# Patient Record
Sex: FEMALE | Race: BLACK OR AFRICAN AMERICAN | NOT HISPANIC OR LATINO | Employment: OTHER | URBAN - METROPOLITAN AREA
[De-identification: names, ages, dates, MRNs, and addresses within clinical notes are randomized per-mention and may not be internally consistent; named-entity substitution may affect disease eponyms.]

---

## 2022-06-02 ENCOUNTER — TELEPHONE (OUTPATIENT)
Dept: NEPHROLOGY | Facility: CLINIC | Age: 77
End: 2022-06-02
Payer: COMMERCIAL

## 2022-06-02 NOTE — TELEPHONE ENCOUNTER
----- Message from Denice Cristina sent at 6/1/2022  2:12 PM CDT -----  Contact: PT  Type: Needs Medical Advice    Who Called: PT  Best Call Back Number: [phone]  Additional  Information: Pt would like a call back to see if she can be switchwed to see one of the providers In at [facility]. Pt said she does not have a car and rely on transportation services for rides to appts. They told her they do not go to [facility]  Please Advise- Thank you

## 2022-06-02 NOTE — TELEPHONE ENCOUNTER
Messaged Dionna to ask if she could see her.  Informed pt that we would find her an appt slot in [place] and call her back.  Ok from Dr Sandoval for pt to be seen in [place].  Left message for pt that appt is 6/30/22 @ 2:30.

## 2022-09-19 DIAGNOSIS — N18.32 STAGE 3B CHRONIC KIDNEY DISEASE: Primary | ICD-10-CM

## 2022-09-21 DIAGNOSIS — N18.32 STAGE 3B CHRONIC KIDNEY DISEASE: Primary | ICD-10-CM

## 2022-09-21 DIAGNOSIS — D63.1 ANEMIA OF CHRONIC RENAL FAILURE, UNSPECIFIED CKD STAGE: ICD-10-CM

## 2022-09-21 DIAGNOSIS — N18.9 ANEMIA OF CHRONIC RENAL FAILURE, UNSPECIFIED CKD STAGE: ICD-10-CM

## 2022-09-21 PROBLEM — I12.9 HYPERTENSION WITH IMPAIRED RENAL FUNCTION: Status: ACTIVE | Noted: 2022-09-21

## 2022-09-21 PROBLEM — N25.81 SECONDARY HYPERPARATHYROIDISM OF RENAL ORIGIN: Status: ACTIVE | Noted: 2022-09-21

## 2022-09-21 PROBLEM — N18.4 CHRONIC KIDNEY DISEASE, STAGE IV (SEVERE): Status: ACTIVE | Noted: 2022-09-21

## 2022-09-21 PROBLEM — E11.9 DIABETES MELLITUS TYPE II, NON INSULIN DEPENDENT: Status: ACTIVE | Noted: 2022-09-21

## 2022-09-21 PROBLEM — E78.2 HYPERLIPIDEMIA, MIXED: Status: ACTIVE | Noted: 2022-09-21

## 2022-09-21 PROBLEM — R80.9 NON-NEPHROTIC RANGE PROTEINURIA: Status: ACTIVE | Noted: 2022-09-21

## 2022-09-21 NOTE — ASSESSMENT & PLAN NOTE
Serum Creatinine 1.75 / eGFR 32; Avoid NSAIDS, Assure 64 oz of water daily, Meds per med list above

## 2022-09-21 NOTE — PROGRESS NOTES
Pt Name:  Maribell Noel  Pt :  1945  Pt MRN:  [id number]    Date: 2022    Reason for visit:   Maribell Noel is a 77 y.o. aa female presenting for CKD follow up with serum creatinine 1.75, eGFR 32 and Chronic Kidney Disease Stage 3b.     Chief Complaint:   The patient denies any complaints today.    HPI:  The patient is being seen today for follow up CKD and the status of her kidney function. Since the last visit, patient reports no health changes.  The patient denies fatigue, weakness, poor appetite, metallic taste, nausea, cramping, chest pain, palpitations, SOB, orthopnea, PND, edema, trouble concentrating, decreased urination.      Home BPs when checked are <130/80 per patient. The patient is following a low sodium diet. The patient is avoiding NSAIDs. The patient is drinking 32 oz of water daily.     Since last visit on 22 by Dr. Sandoval patient reports no changes in medical history.      History:   Past Medical History:   Diagnosis Date    Diabetes mellitus     DM for about 20 yrs    Diabetic macular edema(362.07) 2012    Diabetic macular edema, both eyes     Disorder of kidney and ureter     Glaucoma     Hypertension     Vitreous hemorrhage of right eye      Past Surgical History:   Procedure Laterality Date    CATARACT EXTRACTION      [employer] Injections      Right eye     Family History   Problem Relation Age of Onset    Diabetes Sister     Blindness Neg Hx     Cataracts Neg Hx     Glaucoma Neg Hx     Cancer Daughter      Social History     Substance and Sexual Activity   Alcohol Use No     Social History     Substance and Sexual Activity   Drug Use Not on file     Social History     Substance and Sexual Activity   Sexual Activity Not on file     has no history on file for sexual activity.  Social History     Tobacco Use   Smoking Status Former    Packs/day: 1.00    Years: 49.00    Pack years: 49.00    Types: Cigarettes    Quit date:     Years since quittin.7   Smokeless Tobacco  Never       Allergies:  Review of patient's allergies indicates:   Allergen Reactions    Gabapentin Anaphylaxis         Current Outpatient Medications:     AMLODIPINE BESYLATE (NORVASC ORAL), Take 5 mg by mouth once., Disp: , Rfl:     aspirin (ECOTRIN) 81 MG EC tablet, Take 81 mg by mouth once daily.  , Disp: , Rfl:     atorvastatin (LIPITOR) 80 MG tablet, Take 80 mg by mouth every evening., Disp: , Rfl:     hydroCHLOROthiazide (HYDRODIURIL) 25 MG tablet, Take 25 mg by mouth once daily., Disp: , Rfl:     insulin (LANTUS SOLOSTAR U-100 INSULIN) glargine 100 units/mL SubQ pen, Inject 80 Units into the skin once daily., Disp: , Rfl:     latanoprost 0.005 % ophthalmic solution, 1 drop every evening., Disp: , Rfl:     LISINOPRIL ORAL, Take 20 mg by mouth once daily., Disp: , Rfl:     neomycin-polymyxin-dexamethasone (MAXITROL) 3.5mg/mL-10,000 unit/mL-0.1 % DrpS, 1 drop every 6 (six) hours. prn, Disp: , Rfl:     OMEGA-3 FATTY ACIDS/FISH OIL (OMEGA 3 FISH OIL ORAL), Take 2 capsules by mouth once.  , Disp: , Rfl:     pantoprazole (PROTONIX) 40 MG tablet, Take 40 mg by mouth once daily., Disp: , Rfl:     semaglutide (OZEMPIC) 0.25 mg or 0.5 mg(2 mg/1.5 mL) pen injector, Inject 0.25 mg into the skin every 7 days., Disp: , Rfl:     timolol maleate 0.5% (TIMOPTIC) 0.5 % Drop, Place 1 drop into both eyes once daily., Disp: , Rfl:     ZETIA 10 mg tablet, 10 mg once daily., Disp: , Rfl:     brimonidine-timolol (COMBIGAN) 0.2-0.5 % Drop, Place 1 drop into both eyes 2 (two) times daily. Dispense 3 month supply with 3 refills, Disp: 10 mL, Rfl: 6    calcitRIOL (ROCALTROL) 0.25 MCG Cap, Take 1 capsule (0.25 mcg total) by mouth every Mon, Wed, Fri., Disp: 30 capsule, Rfl: 11  No current facility-administered medications for this visit.    ROS:  Review of Systems   Constitutional:  Negative for activity change and appetite change.   HENT:  Negative for hearing loss.    Eyes:  Negative for visual disturbance.   Respiratory:  Negative for  "shortness of breath and wheezing.    Cardiovascular:  Negative for chest pain.   Gastrointestinal:  Negative for abdominal pain, diarrhea, nausea and vomiting.   Genitourinary:  Negative for decreased urine volume, dysuria, flank pain, frequency and urgency.   Neurological:  Negative for dizziness, weakness and headaches.     Physical Exam:   Vitals:   Vitals:    09/23/22 1002   BP: 133/62   Pulse: 65   SpO2: 95%   Weight: 106.3 kg (234 lb 6.4 oz)   Height: 5' 6" (1.676 m)   PainSc:   6   PainLoc: Back     Body mass index is 37.83 kg/m².    Physical Exam  Vitals reviewed.   Constitutional:       General: She is not in acute distress.     Appearance: Normal appearance.   HENT:      Head: Normocephalic and atraumatic.      Mouth/Throat:      Mouth: Mucous membranes are moist.   Eyes:      Extraocular Movements: Extraocular movements intact.      Pupils: Pupils are equal, round, and reactive to light.   Cardiovascular:      Rate and Rhythm: Normal rate and regular rhythm.      Heart sounds: No murmur heard.  Pulmonary:      Effort: Pulmonary effort is normal.      Breath sounds: No wheezing, rhonchi or rales.   Musculoskeletal:         General: No swelling.      Comments: Uses cane for ambulatory assistance    Skin:     General: Skin is warm and dry.   Neurological:      Mental Status: She is alert and oriented to person, place, and time.   Psychiatric:         Mood and Affect: Mood normal.         Behavior: Behavior normal.       Labs/Tests:  Lab Results   Component Value Date     09/20/2022    K 4.4 09/20/2022     09/20/2022    CO2 24 09/20/2022    BUN 45 (H) 09/20/2022    CREATININE 1.75 (H) 09/20/2022    CREATININE 1.53 (H) 06/02/2022    CREATININE 1.90 (H) 01/28/2022    GLUCOSE 92 09/20/2022    CALCIUM 9.7 09/20/2022    PHOSPHORUS 3.1 09/20/2022    ALBUMIN 4.1 09/20/2022    EGFRNONAA 28 (L) 09/20/2022    EGFRNONAA 33 (L) 06/02/2022    EGFRNONAA 25 (L) 01/28/2022    ESTGFRAFRICA 32 (L) 09/20/2022    " ESTGFRAFRICA 38 (L) 06/02/2022    ESTGFRAFRICA 29 (L) 01/28/2022     (H) 09/20/2022    HGB 9.9 (L) 09/20/2022    HGBA1C 8.4 (H) 12/06/2021    IRON 33 (L) 09/20/2022    TIBC 331 09/20/2022    FERRITIN 9.7 09/20/2022    TRIG 87 06/02/2022    CHOL 148 06/02/2022    HDL 45 06/02/2022    LDLCALC 86 06/02/2022    LABVLDL 17 06/02/2022     Component Ref Range & Units 1 d ago   (9/20/22) 1 yr ago   (7/29/21) 1 yr ago   (3/17/21) 2 yr ago   (6/16/20)   Protein, Urine mg/dL mg/dL 21       Creatinine, Urine mg/dL mg/dL 173.9  55.1  189.0  140.0    Urine Protein/Creatinine Ratio <0.2 mg of protein/mg of creatinine 0.1         Component Ref Range & Units 2 d ago    Ferritin 7.3 - 270.7 ng/mL 9.7      Component Ref Range & Units 2 d ago    Iron 50 - 175 ug/dL 33 Low     TIBC 250 - 425 ug/dL 331    Iron Saturation 15 - 20 % 10 Low         Diagnosis:  1. Diabetes mellitus type II, non insulin dependent  Renal Function Panel    Protein/Creatinine Ratio, Urine    Renal Function Panel    Protein/Creatinine Ratio, Urine      2. Hypertension with impaired renal function  Renal Function Panel    Renal Function Panel      3. Chronic kidney disease, stage IV (severe)  Renal Function Panel    PTH, Intact    Protein/Creatinine Ratio, Urine    Vitamin D    Renal Function Panel    PTH, Intact    Protein/Creatinine Ratio, Urine    Vitamin D      4. Non-nephrotic range proteinuria  Protein/Creatinine Ratio, Urine    Protein/Creatinine Ratio, Urine      5. Secondary hyperparathyroidism of renal origin  PTH, Intact    Vitamin D    PTH, Intact    Vitamin D      6. Hyperlipidemia, mixed        7. Anemia in stage 3b chronic kidney disease  Hemoglobin    Hemoglobin      8. Iron deficiency             Plan:  Diabetes mellitus type II, non insulin dependent  Control status unknown - Continue Lantus and Ozempic     Hypertension with impaired renal function  At goal, Monitor BP, 2 Gram NA diet, Continue Amlodipine 5 mg po daily, HCTZ 25 mg po  daily, Lisinopril 20 mg po daily     Chronic kidney disease, stage IV (severe)  Serum Creatinine 1.75 / eGFR 32; Avoid NSAIDS, Assure 64 oz of water daily, Meds per med list above     Non-nephrotic range proteinuria  100 mg - ACE    Secondary hyperparathyroidism of renal origin   - Start Rocaltrol 25 mcg po M-W-F     Hyperlipidemia, mixed  Continue - Lipitor 80 mg po nightly, Zetia 10 mg po nightly, Omega 3 fish oil po daily     Anemia in stage 3b chronic kidney disease  Hgb 9.9 - No indication for JOSE MARIA therapy at this time    Iron Deficient - Will Replete  Iron 33  TIBC 331  Sat 10%  Ferritin 9.7    Iron deficiency  Iron 33  Sat 10%  TIBC 331  Ferritin 9.7     Will replete with IV Iron        My reconciliation of medication should not condone or support use of medications that have been previously prescribed for patients by other providers.  I am only documenting the current medications patients is taking and may change doses, add or discontinue medications based on information provided by the patient.     The patient has been provided with their current level of kidney function including eGFR and creatinine.    We discussed the potential for common complications of CKD including anemia, electrolyte abnormalities, abnormal fluid balance, mineral bone disease and malnutrition.    We discussed strategies to slow the progression of their kidney disease including:  Avoid nephrotoxic agents. Avoid over-the-counter and prescription NSAIDs (Ibuprofren, Motrin, Naproxyn, Aleve, Mobic, Celebrex, Toradol, Advil). All of these can worsen kidney function, elevate BP, cause fluid retention/swelling and elevate potassium. Avoid iodine contrast agents and gadolinium, which can worsen kidney function and/or cause kidney failure. Avoid phosphosoda bowel preps which can worsen kidney function.  Work to improve modifiable risk factors. Aim for good control of blood glucose without episodes of hypoglycemia. Notify the provider  managing your diabetes if your blood glucose < 60. Aim for good blood pressure control without episodes of hypotension. Call the office if your systolic blood pressure is consistently < 110. Aim for good control of your cholesterol.  AIC goal <7.0  BP goal <130/80        Keeping these in goal range will help prevent progression of cardiovascular disease            and chronic kidney disease.    We discussed dietary modifications:  Low sodium diet: 2 gm/d or less  Limit/avoid high potassium foods  Avoid potassium containing salt substitutes  Limit/avoid high phosphorus foods  Limit daily protein intake to 0.8-1 gm/kg of your ideal body weight.    We discussed lifestyle modifications:  Make sure you are drinking plenty of fluids--64 ounces (1/2 gallon) daily  Exercise at least 30 minutes 5 x per week (total 150 minutes per week), example brisk walking  Achieve and maintain a healthy weight (BMI 20-25)  Limit alcohol consumption to <2 drinks per day  Stop smoking  Make sure you stay current on important vaccines-- pneumonia vaccines (Pneumovax and Prevnar), flu vaccine, Hepatitis B (especially patients nearing renal replacement therapy and planning hemodialysis) and Covid-19 vaccine.     Recommendations:  Monitor your BP at home daily and record.  Bring readings to your next appt.  Call the office if your BP is persistently >130/80.  Seek urgent medical attention with signs and symptoms of uremia - extreme weakness, fatigue, confusion, anorexia, metallic taste in mouth, hiccoughs, cramping, itching, chest pain, swelling, or trouble sleeping.    Follow Up:   Follow up in about 3 months (around 12/23/2022).

## 2022-09-21 NOTE — ASSESSMENT & PLAN NOTE
At goal, Monitor BP, 2 Gram NA diet, Continue Amlodipine 5 mg po daily, HCTZ 25 mg po daily, Lisinopril 20 mg po daily

## 2022-09-22 PROBLEM — D63.1 ANEMIA IN STAGE 3B CHRONIC KIDNEY DISEASE: Status: ACTIVE | Noted: 2022-09-22

## 2022-09-22 PROBLEM — N18.32 ANEMIA IN STAGE 3B CHRONIC KIDNEY DISEASE: Status: ACTIVE | Noted: 2022-09-22

## 2022-09-22 PROBLEM — E61.1 IRON DEFICIENCY: Status: ACTIVE | Noted: 2022-09-22

## 2022-09-22 NOTE — ASSESSMENT & PLAN NOTE
Hgb 9.9 - No indication for JOSE MARIA therapy at this time    Iron Deficient - Will Replete  Iron 33  TIBC 331  Sat 10%  Ferritin 9.7

## 2022-09-23 ENCOUNTER — OFFICE VISIT (OUTPATIENT)
Dept: NEPHROLOGY | Facility: CLINIC | Age: 77
End: 2022-09-23
Payer: COMMERCIAL

## 2022-09-23 VITALS
WEIGHT: 234.38 LBS | HEIGHT: 66 IN | HEART RATE: 65 BPM | SYSTOLIC BLOOD PRESSURE: 133 MMHG | BODY MASS INDEX: 37.67 KG/M2 | DIASTOLIC BLOOD PRESSURE: 62 MMHG | OXYGEN SATURATION: 95 %

## 2022-09-23 DIAGNOSIS — E61.1 IRON DEFICIENCY: ICD-10-CM

## 2022-09-23 DIAGNOSIS — N25.81 SECONDARY HYPERPARATHYROIDISM OF RENAL ORIGIN: ICD-10-CM

## 2022-09-23 DIAGNOSIS — D63.1 ANEMIA IN STAGE 3B CHRONIC KIDNEY DISEASE: ICD-10-CM

## 2022-09-23 DIAGNOSIS — R80.9 NON-NEPHROTIC RANGE PROTEINURIA: ICD-10-CM

## 2022-09-23 DIAGNOSIS — N18.32 ANEMIA IN STAGE 3B CHRONIC KIDNEY DISEASE: ICD-10-CM

## 2022-09-23 DIAGNOSIS — E11.9 DIABETES MELLITUS TYPE II, NON INSULIN DEPENDENT: Primary | ICD-10-CM

## 2022-09-23 DIAGNOSIS — I12.9 HYPERTENSION WITH IMPAIRED RENAL FUNCTION: ICD-10-CM

## 2022-09-23 DIAGNOSIS — E78.2 HYPERLIPIDEMIA, MIXED: ICD-10-CM

## 2022-09-23 DIAGNOSIS — N18.4 CHRONIC KIDNEY DISEASE, STAGE IV (SEVERE): ICD-10-CM

## 2022-09-23 PROCEDURE — 99214 OFFICE O/P EST MOD 30 MIN: CPT | Mod: ,,, | Performed by: NURSE PRACTITIONER

## 2022-09-23 PROCEDURE — 99214 PR OFFICE/OUTPT VISIT, EST, LEVL IV, 30-39 MIN: ICD-10-PCS | Mod: ,,, | Performed by: NURSE PRACTITIONER

## 2022-09-23 RX ORDER — NEOMYCIN SULFATE, POLYMYXIN B SULFATE AND DEXAMETHASONE 3.5; 10000; 1 MG/ML; [USP'U]/ML; MG/ML
1 SUSPENSION/ DROPS OPHTHALMIC
COMMUNITY

## 2022-09-23 RX ORDER — CALCITRIOL 0.25 UG/1
0.25 CAPSULE ORAL
Qty: 30 CAPSULE | Refills: 11 | Status: SHIPPED | OUTPATIENT
Start: 2022-09-23 | End: 2023-02-03

## 2022-09-23 RX ORDER — ATORVASTATIN CALCIUM 80 MG/1
80 TABLET, FILM COATED ORAL NIGHTLY
COMMUNITY

## 2022-09-23 RX ORDER — TIMOLOL MALEATE 5 MG/ML
1 SOLUTION/ DROPS OPHTHALMIC DAILY
COMMUNITY

## 2022-09-23 RX ORDER — LATANOPROST 50 UG/ML
1 SOLUTION/ DROPS OPHTHALMIC NIGHTLY
COMMUNITY

## 2022-09-23 RX ORDER — SEMAGLUTIDE 1.34 MG/ML
0.5 INJECTION, SOLUTION SUBCUTANEOUS
COMMUNITY

## 2022-09-23 RX ORDER — HYDROCHLOROTHIAZIDE 25 MG/1
12.5 TABLET ORAL DAILY
COMMUNITY
End: 2023-06-08

## 2022-09-23 RX ORDER — INSULIN GLARGINE 100 [IU]/ML
70 INJECTION, SOLUTION SUBCUTANEOUS DAILY
COMMUNITY

## 2022-09-23 RX ORDER — PANTOPRAZOLE SODIUM 40 MG/1
40 TABLET, DELAYED RELEASE ORAL DAILY
COMMUNITY

## 2022-12-16 DIAGNOSIS — N18.31 STAGE 3A CHRONIC KIDNEY DISEASE: Primary | ICD-10-CM

## 2022-12-20 NOTE — PROGRESS NOTES
Patient iron studies improved after Iron load but remains deficient; will load again:       Component Ref Range & Units 1 d ago 3 mo ago   Iron 50 - 175 ug/dL 38 Low   33 Low     TIBC 250 - 425 ug/dL 237 Low   331    Iron Saturation 15 - 20 % 16  10 Low       Component Ref Range & Units 1 d ago 3 mo ago   Ferritin 7.3 - 270.7 ng/mL 121.4  9.7      Patient notified and made aware.

## 2023-02-01 ENCOUNTER — TELEPHONE (OUTPATIENT)
Dept: NEPHROLOGY | Facility: CLINIC | Age: 78
End: 2023-02-01
Payer: COMMERCIAL

## 2023-02-01 NOTE — TELEPHONE ENCOUNTER
Tuesday Summer- I called Mrs. Reyna and asked her about her urine if she left one. She said no she could use the restroom. I asked her if she could go back? she said she would see, she doesn't have transportation. I will see if Nery  can go out to her and get a sample.    Tuesday -I spoke with Natacha she said they dont go out for just urine.

## 2023-02-03 ENCOUNTER — OFFICE VISIT (OUTPATIENT)
Dept: NEPHROLOGY | Facility: CLINIC | Age: 78
End: 2023-02-03
Payer: COMMERCIAL

## 2023-02-03 VITALS
DIASTOLIC BLOOD PRESSURE: 63 MMHG | OXYGEN SATURATION: 98 % | HEART RATE: 62 BPM | HEIGHT: 66 IN | WEIGHT: 221 LBS | SYSTOLIC BLOOD PRESSURE: 133 MMHG | BODY MASS INDEX: 35.52 KG/M2

## 2023-02-03 DIAGNOSIS — N18.32 STAGE 3B CHRONIC KIDNEY DISEASE: ICD-10-CM

## 2023-02-03 DIAGNOSIS — I12.9 HYPERTENSION WITH IMPAIRED RENAL FUNCTION: ICD-10-CM

## 2023-02-03 DIAGNOSIS — Z79.4 TYPE 2 DIABETES MELLITUS WITH STAGE 3B CHRONIC KIDNEY DISEASE, WITH LONG-TERM CURRENT USE OF INSULIN: Primary | ICD-10-CM

## 2023-02-03 DIAGNOSIS — E78.2 HYPERLIPIDEMIA, MIXED: ICD-10-CM

## 2023-02-03 DIAGNOSIS — E61.1 IRON DEFICIENCY: ICD-10-CM

## 2023-02-03 DIAGNOSIS — N18.32 TYPE 2 DIABETES MELLITUS WITH STAGE 3B CHRONIC KIDNEY DISEASE, WITH LONG-TERM CURRENT USE OF INSULIN: Primary | ICD-10-CM

## 2023-02-03 DIAGNOSIS — N25.81 SECONDARY HYPERPARATHYROIDISM OF RENAL ORIGIN: ICD-10-CM

## 2023-02-03 DIAGNOSIS — S81.802A WOUND OF LEFT LOWER EXTREMITY, INITIAL ENCOUNTER: ICD-10-CM

## 2023-02-03 DIAGNOSIS — E55.9 VITAMIN D DEFICIENCY, UNSPECIFIED: ICD-10-CM

## 2023-02-03 DIAGNOSIS — E11.22 TYPE 2 DIABETES MELLITUS WITH STAGE 3B CHRONIC KIDNEY DISEASE, WITH LONG-TERM CURRENT USE OF INSULIN: Primary | ICD-10-CM

## 2023-02-03 DIAGNOSIS — R80.9 NON-NEPHROTIC RANGE PROTEINURIA: ICD-10-CM

## 2023-02-03 DIAGNOSIS — E87.20 METABOLIC ACIDOSIS: ICD-10-CM

## 2023-02-03 PROCEDURE — 99214 PR OFFICE/OUTPT VISIT, EST, LEVL IV, 30-39 MIN: ICD-10-PCS | Mod: ,,, | Performed by: NURSE PRACTITIONER

## 2023-02-03 PROCEDURE — 99214 OFFICE O/P EST MOD 30 MIN: CPT | Mod: ,,, | Performed by: NURSE PRACTITIONER

## 2023-02-03 RX ORDER — FAMOTIDINE 20 MG/1
20 TABLET, FILM COATED ORAL
COMMUNITY
Start: 2023-01-10 | End: 2023-09-27

## 2023-02-03 RX ORDER — SODIUM BICARBONATE 650 MG/1
650 TABLET ORAL 3 TIMES DAILY
Qty: 90 TABLET | Refills: 11 | Status: SHIPPED | OUTPATIENT
Start: 2023-02-03 | End: 2023-06-08

## 2023-02-03 RX ORDER — CALCITRIOL 0.25 UG/1
0.25 CAPSULE ORAL
Qty: 30 CAPSULE | Refills: 11 | Status: SHIPPED | OUTPATIENT
Start: 2023-02-03 | End: 2023-06-08

## 2023-02-03 RX ORDER — FLUTICASONE PROPIONATE 50 MCG
SPRAY, SUSPENSION (ML) NASAL
COMMUNITY

## 2023-02-03 RX ORDER — CETIRIZINE HYDROCHLORIDE 10 MG/1
CAPSULE, LIQUID FILLED ORAL
COMMUNITY

## 2023-02-03 RX ORDER — VIT C/E/ZN/COPPR/LUTEIN/ZEAXAN 250MG-90MG
10000 CAPSULE ORAL DAILY
Qty: 30 CAPSULE | Refills: 11
Start: 2023-02-03 | End: 2023-06-08

## 2023-02-03 RX ORDER — HYDROCODONE BITARTRATE AND ACETAMINOPHEN 10; 325 MG/1; MG/1
1 TABLET ORAL
COMMUNITY
Start: 2023-01-07

## 2023-02-03 RX ORDER — SUCRALFATE 1 G/1
1 TABLET ORAL
COMMUNITY
Start: 2022-09-07 | End: 2023-06-08

## 2023-02-03 NOTE — ASSESSMENT & PLAN NOTE
LLE wound 2 x 2 with erythema and abrasion in the center; refer to wound care - culture and biopsy

## 2023-02-03 NOTE — ASSESSMENT & PLAN NOTE
IV Iron course in process -     Component Ref Range & Units 1 mo ago 4 mo ago   Iron 50 - 175 ug/dL 38 Low   33 Low     TIBC 250 - 425 ug/dL 237 Low   331    Iron Saturation 15 - 20 % 16  10 Low       Component Ref Range & Units 1 mo ago 4 mo ago   Ferritin 7.3 - 270.7 ng/mL 121.4  9.7

## 2023-02-03 NOTE — ASSESSMENT & PLAN NOTE
Serum Creatinine 1.61 / 33  (1.75 / eGFR 32 on 9/23/22) - without Anemia - Avoid NSAIDS, Assure 64 oz of water daily, Meds per med list above

## 2023-02-03 NOTE — PROGRESS NOTES
"Pt Name:  Maribell Noel  Pt :  1945  Pt MRN:  [id number]    Date: 2/3/2023    Reason for visit:   Maribell Noel is a 77 y.o. aa female presenting for CKD follow up with serum creatinine 1.61, eGFR 33 and Chronic Kidney Disease Stage 3b.     Chief Complaint:   The patient denies any complaints today.    HPI:  The patient is being seen today for follow up CKD and the status of her kidney function. Since the last visit, patient reports she noticed a wound to the LLE lateral aspect of her leg.  The area is approximately 2 x 2 inches in diameter with a erythematous area including an abrasion appearance in the center.  She denies "pain" but says it is "sore".   She is in the process of receiving weekly IV Iron infusions and thinks it may be due to the IV Iron. She had a prior repletion of IV Iron without incidence.     The patient denies fatigue, weakness, poor appetite, metallic taste, nausea, cramping, chest pain, palpitations, SOB, orthopnea, PND, edema, trouble concentrating, decreased urination.      Home BPs when checked are <130/80 per patient. The patient is following a low sodium diet. The patient is avoiding NSAIDs. The patient is drinking 24 - 32 oz of water + coffee and tea / lemonade daily.     Since last visit on 22 patient reports above noted changes in medical history.      History:   Past Medical History:   Diagnosis Date    Anemia in stage 3b chronic kidney disease 2022    Diabetes mellitus     DM for about 20 yrs    Diabetic macular edema(362.07) 2012    Diabetic macular edema, both eyes     Disorder of kidney and ureter     Glaucoma     Hypertension     Vitreous hemorrhage of right eye      Past Surgical History:   Procedure Laterality Date    CATARACT EXTRACTION      [employer] Injections      Right eye     Family History   Problem Relation Age of Onset    Diabetes Sister     Blindness Neg Hx     Cataracts Neg Hx     Glaucoma Neg Hx     Cancer Daughter      Social History "     Substance and Sexual Activity   Alcohol Use No     Social History     Substance and Sexual Activity   Drug Use Not on file     Social History     Substance and Sexual Activity   Sexual Activity Not on file     has no history on file for sexual activity.  Social History     Tobacco Use   Smoking Status Former    Packs/day: 1.00    Years: 49.00    Pack years: 49.00    Types: Cigarettes    Quit date: 2013    Years since quitting: 10.0   Smokeless Tobacco Never       Allergies:  Review of patient's allergies indicates:   Allergen Reactions    Gabapentin Anaphylaxis         Current Outpatient Medications:     AMLODIPINE BESYLATE (NORVASC ORAL), Take 5 mg by mouth once., Disp: , Rfl:     aspirin (ECOTRIN) 81 MG EC tablet, Take 81 mg by mouth once daily.  , Disp: , Rfl:     atorvastatin (LIPITOR) 80 MG tablet, Take 80 mg by mouth every evening., Disp: , Rfl:     biotin 300 mcg Tab, Take 300 mcg by mouth once daily., Disp: , Rfl:     cetirizine (ZYRTEC) 10 mg Cap, as needed., Disp: , Rfl:     famotidine (PEPCID) 20 MG tablet, Take 20 mg by mouth as needed., Disp: , Rfl:     fluticasone propionate (FLONASE) 50 mcg/actuation nasal spray, as needed., Disp: , Rfl:     hydroCHLOROthiazide (HYDRODIURIL) 25 MG tablet, Take 25 mg by mouth once daily., Disp: , Rfl:     HYDROcodone-acetaminophen (NORCO)  mg per tablet, Take 1 tablet by mouth as needed., Disp: , Rfl:     insulin glargine 100 units/mL SubQ pen, Inject 80 Units into the skin once daily., Disp: , Rfl:     latanoprost 0.005 % ophthalmic solution, 1 drop every evening., Disp: , Rfl:     LISINOPRIL ORAL, Take 20 mg by mouth once daily., Disp: , Rfl:     neomycin-polymyxin-dexamethasone (MAXITROL) 3.5mg/mL-10,000 unit/mL-0.1 % DrpS, 1 drop every 6 (six) hours. prn, Disp: , Rfl:     OMEGA-3 FATTY ACIDS/FISH OIL (OMEGA 3 FISH OIL ORAL), Take 2 capsules by mouth once.  , Disp: , Rfl:     pantoprazole (PROTONIX) 40 MG tablet, Take 40 mg by mouth once daily., Disp: ,  "Rfl:     semaglutide (OZEMPIC) 0.25 mg or 0.5 mg(2 mg/1.5 mL) pen injector, Inject 0.25 mg into the skin every 7 days., Disp: , Rfl:     sucralfate (CARAFATE) 1 gram tablet, Take 1 g by mouth as needed., Disp: , Rfl:     timolol maleate 0.5% (TIMOPTIC) 0.5 % Drop, Place 1 drop into both eyes once daily., Disp: , Rfl:     ZETIA 10 mg tablet, 10 mg once daily., Disp: , Rfl:     calcitRIOL (ROCALTROL) 0.25 MCG Cap, Take 1 capsule (0.25 mcg total) by mouth every Mon, Wed, Fri., Disp: 30 capsule, Rfl: 11    cholecalciferol, vitamin D3, (VITAMIN D3) 25 mcg (1,000 unit) capsule, Take 10 capsules (10,000 Units total) by mouth once daily., Disp: 30 capsule, Rfl: 11    sodium bicarbonate 650 MG tablet, Take 1 tablet (650 mg total) by mouth 3 (three) times daily., Disp: 90 tablet, Rfl: 11    ROS:  Review of Systems   Constitutional:  Negative for activity change and appetite change.   HENT:  Negative for hearing loss.    Eyes:  Negative for visual disturbance.   Respiratory:  Negative for shortness of breath and wheezing.    Cardiovascular:  Negative for chest pain.   Gastrointestinal:  Negative for abdominal pain, diarrhea, nausea and vomiting.   Genitourinary:  Negative for decreased urine volume, dysuria, flank pain, frequency and urgency.   Neurological:  Negative for dizziness, weakness and headaches.     Physical Exam:   Vitals:   Vitals:    02/03/23 1255   BP: 133/63   Pulse: 62   SpO2: 98%   Weight: 100.2 kg (221 lb)   Height: 5' 6" (1.676 m)     Body mass index is 35.67 kg/m².    Physical Exam  Vitals reviewed.   Constitutional:       General: She is not in acute distress.     Appearance: Normal appearance. She is obese.   HENT:      Head: Normocephalic and atraumatic.      Mouth/Throat:      Mouth: Mucous membranes are moist.   Eyes:      Extraocular Movements: Extraocular movements intact.      Pupils: Pupils are equal, round, and reactive to light.   Cardiovascular:      Rate and Rhythm: Normal rate and regular " rhythm.      Heart sounds: Murmur heard.   Systolic murmur is present with a grade of 1/6.   Pulmonary:      Effort: Pulmonary effort is normal.      Breath sounds: No wheezing, rhonchi or rales.   Musculoskeletal:         General: No swelling.      Comments: Uses cane for ambulatory assistance.    Skin:     General: Skin is warm and dry.             Comments: LLE lateral leg 2 x 2 area of erythema with an abrasion appearance in the center.  No drainage, no foul odor, no just tenderness.    Neurological:      Mental Status: She is alert and oriented to person, place, and time.   Psychiatric:         Mood and Affect: Mood normal.         Behavior: Behavior normal.       Labs/Tests:    Contains abnormal data RENAL FUNCTION PANEL     Ref Range & Units 4 d ago   Sodium 136 - 147 mmol/L 140    Potassium 3.5 - 5.1 mmol/L 4.8    Chloride 98 - 107 mmol/L 111 High     CO2 20 - 31 mmol/L 18 Low     Glucose 70 - 99 mg/dL 117 High     BUN 9 - 23 mg/dL 27 High     Creatinine 0.55 - 1.02 mg/dL 1.61 High     Calcium 8.3 - 10.6 mg/dL 9.0    Phosphorus Level 2.4 - 5.1 mg/dL 2.8    Albumin Level 3.2 - 4.8 g/dL 3.9    Anion Gap 5.0 - 15.0 mmol/L 10.5    eGFR CKD-EPI >90 ml/min/1.73m2 33 Low       Lab Results   Component Value Date     (H) 01/30/2023    HGB 11.6 01/30/2023    HGB 9.9 (L) 09/20/2022    HGB 9.1 (L) 06/02/2022    HGBA1C 8.4 (H) 12/06/2021    IRON 38 (L) 12/19/2022    TIBC 237 (L) 12/19/2022    FERRITIN 121.4 12/19/2022    TRIG 87 06/02/2022    CHOL 148 06/02/2022    HDL 45 06/02/2022    LDLCALC 86 06/02/2022    LABVLDL 17 06/02/2022    HWNXTSCE15LY 12.0 (L) 01/30/2023     Component Ref Range & Units 1 mo ago 4 mo ago   Iron 50 - 175 ug/dL 38 Low   33 Low     TIBC 250 - 425 ug/dL 237 Low   331    Iron Saturation 15 - 20 % 16  10 Low       Component Ref Range & Units 1 mo ago 4 mo ago   Ferritin 7.3 - 270.7 ng/mL 121.4  9.7        Lab Results   Component Value Date    UPROTCREA 0.1 09/20/2022    EXTIRONSATUR 16  12/19/2022    EXTIRONSATUR 10 (L) 09/20/2022         Diagnosis:  Plan and Assessment:  1. Type 2 diabetes mellitus with stage 3b chronic kidney disease, with long-term current use of insulin  Assessment & Plan:  Control status unknown - Continue Lantus and Ozempic     Orders:  -     Renal Function Panel; Future; Expected date: 06/02/2023  -     Protein/Creatinine Ratio, Urine; Future; Expected date: 06/02/2023  -     Ambulatory referral/consult to Wound Clinic; Future; Expected date: 02/10/2023    2. Hypertension with impaired renal function  Assessment & Plan:  At goal, Monitor BP, 2 Gram NA diet, Continue Amlodipine 5 mg po daily, HCTZ 25 mg po daily, Lisinopril 20 mg po daily     Orders:  -     Renal Function Panel; Future; Expected date: 06/02/2023    3. Stage 3b chronic kidney disease  Assessment & Plan:  Serum Creatinine 1.61 / 33  (1.75 / eGFR 32 on 9/23/22) - without Anemia - Avoid NSAIDS, Assure 64 oz of water daily, Meds per med list above     Orders:  -     Hemoglobin; Future; Expected date: 06/02/2023  -     Vitamin D; Future; Expected date: 06/02/2023  -     Renal Function Panel; Future; Expected date: 06/02/2023  -     PTH, Intact; Future; Expected date: 06/02/2023  -     Protein/Creatinine Ratio, Urine; Future; Expected date: 06/02/2023    4. Non-nephrotic range proteinuria  Assessment & Plan:  None / Uncalculated - down from 100 mg - ACE    Orders:  -     Protein/Creatinine Ratio, Urine; Future; Expected date: 06/02/2023    5. Secondary hyperparathyroidism of renal origin  Assessment & Plan:   - down from 331 - resttart Rocaltrol 25 mcg po M-W-F (she had stopped this on her own)    Orders:  -     Vitamin D; Future; Expected date: 06/02/2023    6. Hyperlipidemia, mixed  Assessment & Plan:  Continue - Lipitor 80 mg po nightly, Zetia 10 mg po nightly, Omega 3 fish oil po daily       7. Iron deficiency  Assessment & Plan:  IV Iron course in process -     Component Ref Range & Units 1 mo ago 4 mo  ago   Iron 50 - 175 ug/dL 38 Low   33 Low     TIBC 250 - 425 ug/dL 237 Low   331    Iron Saturation 15 - 20 % 16  10 Low       Component Ref Range & Units 1 mo ago 4 mo ago   Ferritin 7.3 - 270.7 ng/mL 121.4  9.7               8. Metabolic acidosis  Assessment & Plan:  C02 18 - Start NA HC03 650 mg po TID     Orders:  -     Renal Function Panel; Future; Expected date: 06/02/2023    9. Vitamin D deficiency, unspecified  Assessment & Plan:  Vitamin D 12 - start Vitamin D 3 10,000 units po daily     Orders:  -     Vitamin D; Future; Expected date: 06/02/2023    10. Wound of left lower extremity, initial encounter  Assessment & Plan:  LLE wound 2 x 2 with erythema and abrasion in the center; refer to wound care - culture and biopsy     Orders:  -     Ambulatory referral/consult to Wound Clinic; Future; Expected date: 02/10/2023    Other orders  -     calcitRIOL (ROCALTROL) 0.25 MCG Cap; Take 1 capsule (0.25 mcg total) by mouth every Mon, Wed, Fri.  Dispense: 30 capsule; Refill: 11  -     sodium bicarbonate 650 MG tablet; Take 1 tablet (650 mg total) by mouth 3 (three) times daily.  Dispense: 90 tablet; Refill: 11  -     cholecalciferol, vitamin D3, (VITAMIN D3) 25 mcg (1,000 unit) capsule; Take 10 capsules (10,000 Units total) by mouth once daily.  Dispense: 30 capsule; Refill: 11           My reconciliation of medication should not condone or support use of medications that have been previously prescribed for patients by other providers.  I am only documenting the current medications patients is taking and may change doses, add or discontinue medications based on information provided by the patient.     The patient has been provided with their current level of kidney function including eGFR and creatinine.    We discussed the potential for common complications of CKD including anemia, electrolyte abnormalities, abnormal fluid balance, mineral bone disease and malnutrition.    We discussed strategies to slow the  progression of their kidney disease including:  Avoid nephrotoxic agents. Avoid over-the-counter and prescription NSAIDs (Ibuprofren, Motrin, Naproxyn, Aleve, Mobic, Celebrex, Toradol, Advil). All of these can worsen kidney function, elevate BP, cause fluid retention/swelling and elevate potassium. Avoid iodine contrast agents and gadolinium, which can worsen kidney function and/or cause kidney failure. Avoid phosphosoda bowel preps which can worsen kidney function.  Work to improve modifiable risk factors. Aim for good control of blood glucose without episodes of hypoglycemia. Notify the provider managing your diabetes if your blood glucose < 60. Aim for good blood pressure control without episodes of hypotension. Call the office if your systolic blood pressure is consistently < 110. Aim for good control of your cholesterol.  AIC goal <7.0  BP goal <130/80        Keeping these in goal range will help prevent progression of cardiovascular disease            and chronic kidney disease.    We discussed dietary modifications:  Low sodium diet: 2 gm/d or less  Limit/avoid high potassium foods  Avoid potassium containing salt substitutes  Limit/avoid high phosphorus foods  Limit daily protein intake to 0.8-1 gm/kg of your ideal body weight.    We discussed lifestyle modifications:  Make sure you are drinking plenty of fluids--64 ounces (1/2 gallon) daily  Exercise at least 30 minutes 5 x per week (total 150 minutes per week), example brisk walking  Achieve and maintain a healthy weight (BMI 20-25)  Limit alcohol consumption to <2 drinks per day  Stop smoking  Make sure you stay current on important vaccines-- pneumonia vaccines (Pneumovax and Prevnar), flu vaccine, Hepatitis B (especially patients nearing renal replacement therapy and planning hemodialysis) and Covid-19 vaccine.     Recommendations:  Monitor your BP at home daily and record.  Bring readings to your next appt.  Call the office if your BP is persistently  >130/80.  Seek urgent medical attention with signs and symptoms of uremia - extreme weakness, fatigue, confusion, anorexia, metallic taste in mouth, hiccoughs, cramping, itching, chest pain, swelling, or trouble sleeping.    Follow Up:   Follow up in about 4 months (around 6/3/2023).

## 2023-06-06 DIAGNOSIS — D50.8 OTHER IRON DEFICIENCY ANEMIA: Primary | ICD-10-CM

## 2023-06-06 NOTE — ASSESSMENT & PLAN NOTE
Serum Creatinine 1.61 / eGFR 33; (1.61 / 33 on 1/30/23 and 1.75 / eGFR 32 on 9/23/22) - without Anemia - Avoid NSAIDS, Assure 64 oz of water daily, Meds per med list above

## 2023-06-06 NOTE — PROGRESS NOTES
"Pt Name:  Maribell Noel  Pt :  1945  Pt MRN:  [id number]    Date: 2023    Reason for visit:   Maribell Noel is a 77 y.o. aa female presenting for CKD follow up with serum creatinine 1.61, eGFR 33 and Chronic Kidney Disease Stage 3b.     Chief Complaint:   The patient denies any complaints today.    HPI:  The patient is being seen today for follow up CKD and the status of her kidney function. Since the last visit, patient reports/medical record review reveals she was seen in the [facility] ER on 3/29/23 and treated for cellulitis of the LLE with Bactrim DS 1 po BID x 7 days.  The patient denies fatigue, weakness, poor appetite, metallic taste, nausea, cramping, chest pain, palpitations, SOB, orthopnea, PND, trouble concentrating, decreased urination.  She c/o edema.  She has stopped several of her home medications stating "she doesn't want to take all those medications anymore".  She stopped:  Amlodipine, Lisinopril, Rocaltrol, Vitamin D3, NA HC03, Zetia, Pepcid and Carafate.     Home BPs when checked are <130/80 per patient. The patient is following a low sodium diet. The patient is avoiding NSAIDs. The patient is drinking 48 oz of water daily.     Since last visit on 2/3/23 patient reports above noted changes in medical history.      History:   Past Medical History:   Diagnosis Date    Anemia in stage 3b chronic kidney disease 2022    Diabetes mellitus     DM for about 20 yrs    Diabetic macular edema(362.07) 2012    Diabetic macular edema, both eyes     Disorder of kidney and ureter     Glaucoma     Hypertension     Iron deficiency 2022    Vitreous hemorrhage of right eye      Past Surgical History:   Procedure Laterality Date    CATARACT EXTRACTION      leg biopsy Left     Lucentis Injections      Right eye     Family History   Problem Relation Age of Onset    Diabetes Sister     Blindness Neg Hx     Cataracts Neg Hx     Glaucoma Neg Hx     Cancer Daughter      Social History     Substance and " Sexual Activity   Alcohol Use No     Social History     Substance and Sexual Activity   Drug Use Not on file     Social History     Substance and Sexual Activity   Sexual Activity Not on file     has no history on file for sexual activity.  Social History     Tobacco Use   Smoking Status Former    Packs/day: 1.00    Years: 49.00    Pack years: 49.00    Types: Cigarettes    Quit date: 2013    Years since quitting: 10.4   Smokeless Tobacco Never       Allergies:  Review of patient's allergies indicates:   Allergen Reactions    Gabapentin Anaphylaxis         Current Outpatient Medications:     aspirin (ECOTRIN) 81 MG EC tablet, Take 81 mg by mouth once daily.  , Disp: , Rfl:     atorvastatin (LIPITOR) 80 MG tablet, Take 80 mg by mouth every evening., Disp: , Rfl:     biotin 300 mcg Tab, Take 300 mcg by mouth once daily., Disp: , Rfl:     cetirizine (ZYRTEC) 10 mg Cap, as needed., Disp: , Rfl:     ferrous sulfate (FEOSOL) 325 mg (65 mg iron) Tab tablet, Take 325 mg by mouth once daily., Disp: , Rfl:     fluticasone propionate (FLONASE) 50 mcg/actuation nasal spray, as needed., Disp: , Rfl:     HYDROcodone-acetaminophen (NORCO)  mg per tablet, Take 1 tablet by mouth as needed., Disp: , Rfl:     insulin glargine 100 units/mL SubQ pen, Inject 70 Units into the skin once daily., Disp: , Rfl:     latanoprost 0.005 % ophthalmic solution, Place 1 drop into both eyes every evening., Disp: , Rfl:     OMEGA-3 FATTY ACIDS/FISH OIL (OMEGA 3 FISH OIL ORAL), Take 2 capsules by mouth once.  , Disp: , Rfl:     pantoprazole (PROTONIX) 40 MG tablet, Take 40 mg by mouth once daily., Disp: , Rfl:     semaglutide (OZEMPIC) 0.25 mg or 0.5 mg(2 mg/1.5 mL) pen injector, Inject 0.5 mg into the skin every 7 days., Disp: , Rfl:     timolol maleate 0.5% (TIMOPTIC) 0.5 % Drop, Place 1 drop into both eyes once daily., Disp: , Rfl:     [START ON 6/9/2023] calcitRIOL (ROCALTROL) 0.25 MCG Cap, Take 1 capsule (0.25 mcg total) by mouth every Mon,  "Wed, Fri., Disp: 30 capsule, Rfl: 11    cholecalciferol, vitamin D3, (VITAMIN D3) 25 mcg (1,000 unit) capsule, Take 10 capsules (10,000 Units total) by mouth once daily., Disp: 30 capsule, Rfl: 11    famotidine (PEPCID) 20 MG tablet, Take 20 mg by mouth as needed., Disp: , Rfl:     hydroCHLOROthiazide (HYDRODIURIL) 12.5 MG Tab, Take 1 tablet (12.5 mg total) by mouth once daily., Disp: 90 tablet, Rfl: 3    hydrOXYzine HCL (ATARAX) 25 MG tablet, as needed., Disp: , Rfl:     lisinopriL 10 MG tablet, Take 1 tablet (10 mg total) by mouth once daily., Disp: 90 tablet, Rfl: 3    neomycin-polymyxin-dexamethasone (MAXITROL) 3.5mg/mL-10,000 unit/mL-0.1 % DrpS, Place 1 drop into both eyes every 6 (six) hours. prn, Disp: , Rfl:     sodium bicarbonate 650 MG tablet, Take 1 tablet (650 mg total) by mouth 3 (three) times daily., Disp: 90 tablet, Rfl: 11    triamcinolone acetonide 0.025% (KENALOG) 0.025 % cream, Apply topically as needed., Disp: , Rfl:     ROS:  Review of Systems   Constitutional:  Negative for activity change and appetite change.   HENT:  Negative for hearing loss.    Eyes:  Negative for visual disturbance.   Respiratory:  Negative for shortness of breath and wheezing.    Cardiovascular:  Positive for leg swelling. Negative for chest pain.   Gastrointestinal:  Negative for abdominal pain, diarrhea, nausea and vomiting.   Genitourinary:  Negative for decreased urine volume, dysuria, flank pain, frequency and urgency.   Neurological:  Negative for dizziness, weakness and headaches.     Physical Exam:   Vitals:   Vitals:    06/08/23 1046   BP: 131/62   Pulse: (!) 56   SpO2: 99%   Weight: 100.2 kg (221 lb)   Height: 5' 6.5" (1.689 m)     Body mass index is 35.14 kg/m².    Physical Exam  Vitals reviewed.   Constitutional:       General: She is not in acute distress.     Appearance: Normal appearance. She is morbidly obese.   HENT:      Head: Normocephalic and atraumatic.      Mouth/Throat:      Mouth: Mucous membranes " are moist.   Eyes:      Extraocular Movements: Extraocular movements intact.      Pupils: Pupils are equal, round, and reactive to light.   Cardiovascular:      Rate and Rhythm: Regular rhythm. Bradycardia present.      Heart sounds: No murmur heard.  Pulmonary:      Effort: Pulmonary effort is normal.      Breath sounds: No wheezing, rhonchi or rales.   Musculoskeletal:         General: No swelling.      Comments: Uses cane for ambulatory assistance   Skin:     General: Skin is warm and dry.             Comments: Skin lesion - to left lateral calf approximately 2 cm by 1.5 cm - dark in color, dried and raised.    Neurological:      Mental Status: She is alert and oriented to person, place, and time.   Psychiatric:         Mood and Affect: Mood normal.         Behavior: Behavior normal.         Labs/Tests:    Contains abnormal data RENAL FUNCTION PANEL 6/5/23     Ref Range & Units 1 d ago   Sodium 136 - 147 mmol/L 139    Potassium 3.5 - 5.1 mmol/L 4.3    Chloride 98 - 107 mmol/L 106    CO2 20 - 31 mmol/L 21    Glucose 70 - 99 mg/dL 120 High     BUN 9 - 23 mg/dL 28 High     Creatinine 0.55 - 1.02 mg/dL 1.61 High     Calcium 8.3 - 10.6 mg/dL 9.2    Phosphorus Level 2.4 - 5.1 mg/dL 3.3    Albumin Level 3.2 - 4.8 g/dL 4.0    Anion Gap 5.0 - 15.0 mmol/L 12.0    eGFR CKD-EPI >90 ml/min/1.73m2 33 Low       Lab Results   Component Value Date     (H) 06/05/2023    HGB 11.7 06/05/2023    HGB 9.0 (L) 03/29/2023    HGB 11.6 01/30/2023    HGBA1C 6.6 (H) 06/05/2023    IRON 64 06/05/2023    TIBC 228 (L) 06/05/2023    FERRITIN 137.1 06/05/2023    TRIG 88 06/05/2023    CHOL 166 06/05/2023    HDL 50 06/05/2023    LDLCALC 98 06/05/2023    LABVLDL 18 06/05/2023    IBXJPHGB35OT 8.2 (L) 06/05/2023     Contains abnormal data IRON AND TOTAL IRON BINDING CAPACITY, SERUM  6/5/23  Status: Final result     Next appt: Today at 11:00 AM in Nephrology (NABEEL Sandhu)             Component Ref Range & Units 3 d ago 4 mo ago 5 mo  "ago 8 mo ago   Iron 50 - 175 ug/dL 64  43 Low   38 Low   33 Low     TIBC 250 - 425 ug/dL 228 Low   228 Low   237 Low   331    Iron Saturation 15 - 20 % 28 High   19  16  10 Low        FERRITIN  6/5/23    Status: Final result     Next appt: Today at 11:00 AM in Nephrology (MICHAEL Cornejo, [facility])             Component Ref Range & Units 3 d ago 4 mo ago 5 mo ago 8 mo ago   Ferritin 7.3 - 270.7 ng/mL 137.1  160.6  121.4  9.7           Lab Results   Component Value Date    UPROTCREA 0.1 09/20/2022    EXTIRONSATUR 28 (H) 06/05/2023    EXTIRONSATUR 19 02/06/2023    EXTIRONSATUR 16 12/19/2022         Diagnosis:  Plan and Assessment:  1. Type 2 diabetes mellitus with stage 3b chronic kidney disease, with long-term current use of insulin  Assessment & Plan:  Well controlled with HgbA1c of 6.6% on 6/5/23  - Continue Lantus and Ozempic     Orders:  -     Renal Function Panel; Future; Expected date: 09/08/2023  -     Ambulatory referral/consult to Wound Clinic; Future; Expected date: 06/15/2023    2. Hypertension with impaired renal function  Assessment & Plan:  At goal, Monitor BP, 2 Gram NA diet, She stopped her Amlodipine 5 mg po daily and Lisinopril 20 mg po daily; She is only taking HCTZ 25 mg po daily but states "she must take this for her swelling".     I will decrease her HCTZ to 12.5 mg and restart Lisinopril 10 mg po daily.  Have her monitor her Bps twice daily for 2 weeks and submit readings.     Orders:  -     Renal Function Panel; Future; Expected date: 09/08/2023    3. Stage 3b chronic kidney disease  Assessment & Plan:  Serum Creatinine 1.61 / eGFR 33; (1.61 / 33 on 1/30/23 and 1.75 / eGFR 32 on 9/23/22) - without Anemia - Avoid NSAIDS, Assure 64 oz of water daily, Meds per med list above     Orders:  -     Hemoglobin; Future; Expected date: 09/08/2023  -     Vitamin D; Future; Expected date: 09/08/2023  -     Renal Function Panel; Future; Expected date: 09/08/2023  -     PTH, Intact; Future; Expected date: " 09/08/2023  -     Protein/Creatinine Ratio, Urine; Future; Expected date: 09/08/2023    4. Secondary hyperparathyroidism of renal origin  Assessment & Plan:   - up from 268  - She stopped her Rocaltrol.  Restart Rocaltrol 25 mcg po M-W-F     Orders:  -     Vitamin D; Future; Expected date: 09/08/2023  -     PTH, Intact; Future; Expected date: 09/08/2023    5. Hyperlipidemia, mixed  Assessment & Plan:  Continue - Lipitor 80 mg po nightly, Omega 3 fish oil po daily - She stopped her Zetia       6. Metabolic acidosis  Assessment & Plan:  C02 21 - up from 18 - She stopped her NA HC03; Restart  NA HC03 650 mg po TID     Orders:  -     Renal Function Panel; Future; Expected date: 09/08/2023    7. Vitamin D deficiency, unspecified  Assessment & Plan:  Vitamin D 8.2 - down from 12 - She stopped her Vitamin D3; Restart Vitamin D3 10,000 units po daily     Orders:  -     Vitamin D; Future; Expected date: 09/08/2023    8. Non-nephrotic range proteinuria  Assessment & Plan:  She did not provide a specimen for Urine testing -  She stopped her Lisinopril - but I am restarting at a lower dose.  ACE     Orders:  -     Protein/Creatinine Ratio, Urine; Future; Expected date: 09/08/2023    9. Wound of left lower extremity, subsequent encounter  Assessment & Plan:  Left lateral leg - wound 2 cm x 1.5 cm - dry, raised, dark in color.  - Refer back to wound care     Orders:  -     Ambulatory referral/consult to Wound Clinic; Future; Expected date: 06/15/2023    10. Morbid obesity  Assessment & Plan:  BMI 35.14 - Exercises as health allows.       11. Non compliance w medication regimen  Assessment & Plan:  She stopped her Amlodipine, Lisinopril, Zetia, Rocaltrol, Vitamin D3, NA HC03, Pepcid and Carafate on her own.  She has been counseled on the need to adhere to medication regimen.     I am restarting the Lisinopril, Vitamin D3, Rocaltrol, and NA HC03.       Other orders  -     hydroCHLOROthiazide (HYDRODIURIL) 12.5 MG Tab; Take 1  tablet (12.5 mg total) by mouth once daily.  Dispense: 90 tablet; Refill: 3  -     lisinopriL 10 MG tablet; Take 1 tablet (10 mg total) by mouth once daily.  Dispense: 90 tablet; Refill: 3  -     calcitRIOL (ROCALTROL) 0.25 MCG Cap; Take 1 capsule (0.25 mcg total) by mouth every Mon, Wed, Fri.  Dispense: 30 capsule; Refill: 11  -     cholecalciferol, vitamin D3, (VITAMIN D3) 25 mcg (1,000 unit) capsule; Take 10 capsules (10,000 Units total) by mouth once daily.  Dispense: 30 capsule; Refill: 11  -     sodium bicarbonate 650 MG tablet; Take 1 tablet (650 mg total) by mouth 3 (three) times daily.  Dispense: 90 tablet; Refill: 11       Extensive patient education regarding her renal disease, the various conditions that come along with renal disease.  The need to adhere to her medication regimen.  The rationale for her medications.  Have re-ordered 5 of the medications after she stopped 8 of her home meds.  Have also referred her back to wound care for her LLE leg wound that is not healing.  Have requested a biopsy to r/u calciphylaxis.     My reconciliation of medication should not condone or support use of medications that have been previously prescribed for patients by other providers.  I am only documenting the current medications patients is taking and may change doses, add or discontinue medications based on information provided by the patient.     The patient has been provided with their current level of kidney function including eGFR and creatinine.    We discussed the potential for common complications of CKD including anemia, electrolyte abnormalities, abnormal fluid balance, mineral bone disease and malnutrition.    We discussed strategies to slow the progression of their kidney disease including:  Avoid nephrotoxic agents. Avoid over-the-counter and prescription NSAIDs (Ibuprofren, Motrin, Naproxyn, Aleve, Mobic, Celebrex, Toradol, Advil). All of these can worsen kidney function, elevate BP, cause fluid  retention/swelling and elevate potassium. Avoid iodine contrast agents and gadolinium, which can worsen kidney function and/or cause kidney failure. Avoid phosphosoda bowel preps which can worsen kidney function.  Work to improve modifiable risk factors. Aim for good control of blood glucose without episodes of hypoglycemia. Notify the provider managing your diabetes if your blood glucose < 60. Aim for good blood pressure control without episodes of hypotension. Call the office if your systolic blood pressure is consistently < 110. Aim for good control of your cholesterol.  AIC goal <7.0  BP goal <130/80        Keeping these in goal range will help prevent progression of cardiovascular disease            and chronic kidney disease.    We discussed dietary modifications:  Low sodium diet: 2 gm/d or less  Limit/avoid high potassium foods  Avoid potassium containing salt substitutes  Limit/avoid high phosphorus foods  Limit daily protein intake to 0.8-1 gm/kg of your ideal body weight.    We discussed lifestyle modifications:  Make sure you are drinking plenty of fluids--64 ounces (1/2 gallon) daily  Exercise at least 30 minutes 5 x per week (total 150 minutes per week), example brisk walking  Achieve and maintain a healthy weight (BMI 20-25)  Limit alcohol consumption to <2 drinks per day  Stop smoking  Make sure you stay current on important vaccines-- pneumonia vaccines (Pneumovax and Prevnar), flu vaccine, Hepatitis B (especially patients nearing renal replacement therapy and planning hemodialysis) and Covid-19 vaccine.     Recommendations:  Monitor your BP at home daily and record.  Bring readings to your next appt.  Call the office if your BP is persistently >130/80.  Seek urgent medical attention with signs and symptoms of uremia - extreme weakness, fatigue, confusion, anorexia, metallic taste in mouth, hiccoughs, cramping, itching, chest pain, swelling, or trouble sleeping.    Follow Up:   Follow up in about 3  months (around 9/8/2023).

## 2023-06-06 NOTE — ASSESSMENT & PLAN NOTE
Vitamin D 8.2 - down from 12 - She stopped her Vitamin D3; Restart Vitamin D3 10,000 units po daily

## 2023-06-08 ENCOUNTER — OFFICE VISIT (OUTPATIENT)
Dept: NEPHROLOGY | Facility: CLINIC | Age: 78
End: 2023-06-08
Payer: COMMERCIAL

## 2023-06-08 VITALS
DIASTOLIC BLOOD PRESSURE: 62 MMHG | OXYGEN SATURATION: 99 % | SYSTOLIC BLOOD PRESSURE: 131 MMHG | HEART RATE: 56 BPM | BODY MASS INDEX: 34.69 KG/M2 | WEIGHT: 221 LBS | HEIGHT: 67 IN

## 2023-06-08 DIAGNOSIS — E78.2 HYPERLIPIDEMIA, MIXED: ICD-10-CM

## 2023-06-08 DIAGNOSIS — E87.20 METABOLIC ACIDOSIS: ICD-10-CM

## 2023-06-08 DIAGNOSIS — Z79.4 TYPE 2 DIABETES MELLITUS WITH STAGE 3B CHRONIC KIDNEY DISEASE, WITH LONG-TERM CURRENT USE OF INSULIN: ICD-10-CM

## 2023-06-08 DIAGNOSIS — R80.9 NON-NEPHROTIC RANGE PROTEINURIA: ICD-10-CM

## 2023-06-08 DIAGNOSIS — E11.22 TYPE 2 DIABETES MELLITUS WITH STAGE 3B CHRONIC KIDNEY DISEASE, WITH LONG-TERM CURRENT USE OF INSULIN: ICD-10-CM

## 2023-06-08 DIAGNOSIS — N18.32 TYPE 2 DIABETES MELLITUS WITH STAGE 3B CHRONIC KIDNEY DISEASE, WITH LONG-TERM CURRENT USE OF INSULIN: ICD-10-CM

## 2023-06-08 DIAGNOSIS — N18.32 STAGE 3B CHRONIC KIDNEY DISEASE: ICD-10-CM

## 2023-06-08 DIAGNOSIS — Z91.148 NON COMPLIANCE W MEDICATION REGIMEN: ICD-10-CM

## 2023-06-08 DIAGNOSIS — E55.9 VITAMIN D DEFICIENCY, UNSPECIFIED: ICD-10-CM

## 2023-06-08 DIAGNOSIS — I12.9 HYPERTENSION WITH IMPAIRED RENAL FUNCTION: ICD-10-CM

## 2023-06-08 DIAGNOSIS — E66.01 MORBID OBESITY: ICD-10-CM

## 2023-06-08 DIAGNOSIS — N25.81 SECONDARY HYPERPARATHYROIDISM OF RENAL ORIGIN: ICD-10-CM

## 2023-06-08 DIAGNOSIS — S81.802D WOUND OF LEFT LOWER EXTREMITY, SUBSEQUENT ENCOUNTER: ICD-10-CM

## 2023-06-08 PROBLEM — E61.1 IRON DEFICIENCY: Status: RESOLVED | Noted: 2022-09-22 | Resolved: 2023-06-08

## 2023-06-08 PROCEDURE — 99215 PR OFFICE/OUTPT VISIT, EST, LEVL V, 40-54 MIN: ICD-10-PCS | Mod: S$GLB,,, | Performed by: NURSE PRACTITIONER

## 2023-06-08 PROCEDURE — 99215 OFFICE O/P EST HI 40 MIN: CPT | Mod: S$GLB,,, | Performed by: NURSE PRACTITIONER

## 2023-06-08 RX ORDER — CALCITRIOL 0.25 UG/1
0.25 CAPSULE ORAL
Qty: 30 CAPSULE | Refills: 11 | Status: SHIPPED | OUTPATIENT
Start: 2023-06-09 | End: 2023-09-27

## 2023-06-08 RX ORDER — FERROUS SULFATE 325(65) MG
325 TABLET ORAL DAILY
COMMUNITY
End: 2023-09-27

## 2023-06-08 RX ORDER — HYDROCHLOROTHIAZIDE 12.5 MG/1
12.5 TABLET ORAL DAILY
Qty: 90 TABLET | Refills: 3 | Status: SHIPPED | OUTPATIENT
Start: 2023-06-08 | End: 2024-06-07

## 2023-06-08 RX ORDER — LISINOPRIL 10 MG/1
10 TABLET ORAL DAILY
Qty: 90 TABLET | Refills: 3 | Status: SHIPPED | OUTPATIENT
Start: 2023-06-08 | End: 2024-06-07

## 2023-06-08 RX ORDER — HYDROXYZINE HYDROCHLORIDE 25 MG/1
TABLET, FILM COATED ORAL
COMMUNITY

## 2023-06-08 RX ORDER — VIT C/E/ZN/COPPR/LUTEIN/ZEAXAN 250MG-90MG
10000 CAPSULE ORAL DAILY
Qty: 30 CAPSULE | Refills: 11 | Status: SHIPPED | OUTPATIENT
Start: 2023-06-08 | End: 2023-09-27

## 2023-06-08 RX ORDER — TRIAMCINOLONE ACETONIDE 0.25 MG/G
CREAM TOPICAL
COMMUNITY
Start: 2023-04-03 | End: 2023-09-27

## 2023-06-08 RX ORDER — SODIUM BICARBONATE 650 MG/1
650 TABLET ORAL 3 TIMES DAILY
Qty: 90 TABLET | Refills: 11 | Status: SHIPPED | OUTPATIENT
Start: 2023-06-08 | End: 2023-09-27

## 2023-06-08 NOTE — ASSESSMENT & PLAN NOTE
She did not provide a specimen for Urine testing -  She stopped her Lisinopril - but I am restarting at a lower dose.  ACE

## 2023-06-08 NOTE — ASSESSMENT & PLAN NOTE
She stopped her Amlodipine, Lisinopril, Zetia, Rocaltrol, Vitamin D3, NA HC03, Pepcid and Carafate on her own.  She has been counseled on the need to adhere to medication regimen.     I am restarting the Lisinopril, Vitamin D3, Rocaltrol, and NA HC03.

## 2023-09-25 NOTE — ASSESSMENT & PLAN NOTE
At goal, Monitor BP, 2 Gram NA diet, Continue Amlodipine 5 mg po daily,  Lisinopril 10 mg po daily, HCTZ 12.5 mg po daily

## 2023-09-25 NOTE — ASSESSMENT & PLAN NOTE
Vitamin D 6.6 -  She once again stopped her Vitamin D3 - Resume Vitamin D3 10,000 units po daily

## 2023-09-25 NOTE — PROGRESS NOTES
Pt Name:  Marbiell Noel  Pt :  1945  Pt MRN:  [id number]    Date: 2023    Reason for visit:   Maribell Noel is a 78 y.o. aa female presenting for CKD follow up with serum creatinine 1.61, eGFR 33 and Chronic Kidney Disease Stage 3b.     Chief Complaint:   The patient denies any complaints today.    HPI:  The patient is being seen today for follow up CKD and the status of her kidney function. Since the last visit, patient reports no health changes.  The patient denies fatigue, weakness, poor appetite, metallic taste, nausea, cramping, chest pain, palpitations, SOB, orthopnea, PND, edema, trouble concentrating, decreased urination.  She has once again stopped several of her home meds including:  NA HC03, Rocaltrol, Iron, and Vitamin D3.     Home BPs when checked are <130/80 per patient. The patient is following a low sodium diet. The patient is avoiding NSAIDs. The patient is drinking 48 - 64 oz of water daily.     Since last visit on 23 patient reports no changes in medical history.      History:   Past Medical History:   Diagnosis Date    Anemia in stage 3b chronic kidney disease 2022    Diabetes mellitus     DM for about 20 yrs    Diabetic macular edema(362.07) 2012    Diabetic macular edema, both eyes     Disorder of kidney and ureter     Glaucoma     Hypertension     Iron deficiency 2022    Vitreous hemorrhage of right eye     Wound of left leg 2/3/2023     Past Surgical History:   Procedure Laterality Date    CATARACT EXTRACTION      leg biopsy Left     [employer] Injections      Right eye     Family History   Problem Relation Age of Onset    Diabetes Sister     Blindness Neg Hx     Cataracts Neg Hx     Glaucoma Neg Hx     Cancer Daughter      Social History     Substance and Sexual Activity   Alcohol Use No     Social History     Substance and Sexual Activity   Drug Use Not on file     Social History     Substance and Sexual Activity   Sexual Activity Not on file     has no history  on file for sexual activity.  Social History     Tobacco Use   Smoking Status Former    Current packs/day: 0.00    Average packs/day: 1 pack/day for 49.0 years (49.0 ttl pk-yrs)    Types: Cigarettes    Start date: 1964    Quit date: 2013    Years since quitting: 10.7   Smokeless Tobacco Never       Allergies:  Review of patient's allergies indicates:   Allergen Reactions    Gabapentin Anaphylaxis         Current Outpatient Medications:     amLODIPine (NORVASC) 5 MG tablet, Take 1 tablet by mouth once daily., Disp: , Rfl:     aspirin (ECOTRIN) 81 MG EC tablet, Take 81 mg by mouth once daily.  , Disp: , Rfl:     atorvastatin (LIPITOR) 80 MG tablet, Take 80 mg by mouth every evening., Disp: , Rfl:     biotin 300 mcg Tab, Take 300 mcg by mouth once daily., Disp: , Rfl:     cetirizine (ZYRTEC) 10 mg Cap, as needed., Disp: , Rfl:     ezetimibe (ZETIA) 10 mg tablet, Take 1 tablet by mouth once daily., Disp: , Rfl:     fluticasone propionate (FLONASE) 50 mcg/actuation nasal spray, as needed for Rhinitis or Allergies., Disp: , Rfl:     hydroCHLOROthiazide (HYDRODIURIL) 12.5 MG Tab, Take 1 tablet (12.5 mg total) by mouth once daily., Disp: 90 tablet, Rfl: 3    HYDROcodone-acetaminophen (NORCO)  mg per tablet, Take 1 tablet by mouth as needed for Pain., Disp: , Rfl:     hydrOXYzine HCL (ATARAX) 25 MG tablet, as needed for Itching or Anxiety., Disp: , Rfl:     insulin glargine 100 units/mL SubQ pen, Inject 70 Units into the skin once daily. lantus, Disp: , Rfl:     latanoprost 0.005 % ophthalmic solution, Place 1 drop into both eyes every evening., Disp: , Rfl:     lisinopriL 10 MG tablet, Take 1 tablet (10 mg total) by mouth once daily., Disp: 90 tablet, Rfl: 3    neomycin-polymyxin-dexamethasone (MAXITROL) 3.5mg/mL-10,000 unit/mL-0.1 % DrpS, Place 1 drop into both eyes. prn, Disp: , Rfl:     OMEGA-3 FATTY ACIDS/FISH OIL (OMEGA 3 FISH OIL ORAL), Take 2 capsules by mouth once.  , Disp: , Rfl:     pantoprazole (PROTONIX)  "40 MG tablet, Take 40 mg by mouth once daily., Disp: , Rfl:     semaglutide (OZEMPIC) 0.25 mg or 0.5 mg(2 mg/1.5 mL) pen injector, Inject 0.5 mg into the skin every 7 days., Disp: , Rfl:     timolol maleate 0.5% (TIMOPTIC) 0.5 % Drop, Place 1 drop into both eyes once daily., Disp: , Rfl:     calcitRIOL (ROCALTROL) 0.25 MCG Cap, Take 1 capsule (0.25 mcg total) by mouth once daily., Disp: 30 capsule, Rfl: 11    cholecalciferol, vitamin D3, (VITAMIN D3) 25 mcg (1,000 unit) capsule, Take 10 capsules (10,000 Units total) by mouth once daily., Disp: 30 capsule, Rfl: 11    sodium bicarbonate 650 MG tablet, Take 1 tablet (650 mg total) by mouth 3 (three) times daily., Disp: 90 tablet, Rfl: 11    ROS:  Review of Systems   Constitutional:  Negative for activity change and appetite change.   HENT:  Negative for hearing loss.    Eyes:  Negative for visual disturbance.   Respiratory:  Negative for shortness of breath and wheezing.    Cardiovascular:  Negative for chest pain.   Gastrointestinal:  Negative for abdominal pain, diarrhea, nausea and vomiting.   Genitourinary:  Negative for decreased urine volume, dysuria, flank pain, frequency and urgency.   Neurological:  Negative for dizziness, weakness and headaches.       Physical Exam:   Vitals:   Vitals:    09/27/23 1019   BP: 132/60   Pulse: 67   SpO2: 99%   Weight: 101.6 kg (224 lb)   Height: 5' 6.5" (1.689 m)     Body mass index is 35.61 kg/m².    Physical Exam  Vitals reviewed.   Constitutional:       General: She is not in acute distress.     Appearance: Normal appearance. She is morbidly obese.   HENT:      Head: Normocephalic and atraumatic.      Mouth/Throat:      Mouth: Mucous membranes are moist.   Eyes:      Extraocular Movements: Extraocular movements intact.      Pupils: Pupils are equal, round, and reactive to light.   Cardiovascular:      Rate and Rhythm: Normal rate and regular rhythm.      Heart sounds: No murmur heard.  Pulmonary:      Effort: Pulmonary effort " is normal.      Breath sounds: No wheezing, rhonchi or rales.   Musculoskeletal:         General: No swelling.      Comments: Uses cane for ambulatory assistance.    Skin:     General: Skin is warm and dry.             Comments: Left lateral lower leg above ankle has darkened skin. There is an area to left lateral, slightly oval, approximate size of a dime.  Slightly raised, dark, tender to the touch.     Neurological:      Mental Status: She is alert and oriented to person, place, and time.   Psychiatric:         Mood and Affect: Mood normal.         Behavior: Behavior normal.           Labs/Tests 9/25/23:      K 4.7    C02 23  Glu 108  BUN 33  Creat 1.61  CA 9.2  Phos 3.6  Alb 3.9  eGFR 33        Vit D 6.6    Hgb 11.3        Lab Results   Component Value Date     (H) 06/05/2023    HGB 11.7 06/05/2023    HGB 9.0 (L) 03/29/2023    HGB 11.6 01/30/2023    HGBA1C 6.6 (H) 06/05/2023    IRON 64 06/05/2023    TIBC 228 (L) 06/05/2023    FERRITIN 137.1 06/05/2023    TRIG 88 06/05/2023    CHOL 166 06/05/2023    HDL 50 06/05/2023    LDLCALC 98 06/05/2023    LABVLDL 18 06/05/2023    ICTAJHYD56BR 8.2 (L) 06/05/2023         Lab Results   Component Value Date    UPROTCREA 0.1 09/20/2022    EXTIRONSATUR 28 (H) 06/05/2023    EXTIRONSATUR 19 02/06/2023    EXTIRONSATUR 16 12/19/2022         Diagnosis:  Plan and Assessment:  1. Type 2 diabetes mellitus with stage 3b chronic kidney disease, with long-term current use of insulin  Assessment & Plan:  Well controlled with HgbA1c of 6.6% on 6/5/23  - Continue Lantus and Ozempic       2. Hypertension with impaired renal function  Assessment & Plan:  At goal, Monitor BP, 2 Gram NA diet, Continue Amlodipine 5 mg po daily,  Lisinopril 10 mg po daily, HCTZ 12.5 mg po daily       3. Stage 3b chronic kidney disease  Assessment & Plan:  Serum Creatinine 1.61 / eGFR 33; (1.61 / eGFR 33 on 6/5/23 and 1.61 / 33 on 1/30/23 and 1.75 / eGFR 32 on 9/23/22) - Avoid NSAIDS, Assure  64 oz of water daily, Meds per med list above       4. Anemia in stage 3b chronic kidney disease  Assessment & Plan:  Hgb 11.3 - No indication for JOSE MARIA therapy at this time.       5. Secondary hyperparathyroidism of renal origin  Assessment & Plan:  PTH - 355 - Once again she has stopped her Rocaltrol.  Will resume Rocaltrol 25 mcg po Daily       6. Hyperlipidemia, mixed  Assessment & Plan:  Continue - Lipitor 80 mg po nightly, Omega 3 fish oil po daily, Zetia 10 mg po daily       7. Metabolic acidosis  Assessment & Plan:  C02 23 - Once again she stopped her NA HC03;    She wants to take Baking Soda - she will take 1/2 teaspoon po TID.       8. Vitamin D deficiency, unspecified  Assessment & Plan:  Vitamin D 6.6 -  She once again stopped her Vitamin D3 - Resume Vitamin D3 10,000 units po daily        9. Non-nephrotic range proteinuria  Assessment & Plan:  She did not provide a specimen for Urine testing -  And she did not provide a urine specimen for her last OV -       10. Morbid obesity  Assessment & Plan:  BMI 35.61 - Exercises as health allows.       11. Non compliance w medication regimen  Assessment & Plan:  She stopped her Rocaltrol, Vitamin D3, NA HC03, and Iron on her own.  She has been counseled on the need to adhere to medication regimen.     I am restarting the Vitamin D3, Rocaltrol, and NA HC03 (Baking Soda).       Other orders  -     calcitRIOL (ROCALTROL) 0.25 MCG Cap; Take 1 capsule (0.25 mcg total) by mouth once daily.  Dispense: 30 capsule; Refill: 11  -     cholecalciferol, vitamin D3, (VITAMIN D3) 25 mcg (1,000 unit) capsule; Take 10 capsules (10,000 Units total) by mouth once daily.  Dispense: 30 capsule; Refill: 11  -     sodium bicarbonate 650 MG tablet; Take 1 tablet (650 mg total) by mouth 3 (three) times daily.  Dispense: 90 tablet; Refill: 11           My reconciliation of medication should not condone or support use of medications that have been previously prescribed for patients by other  providers.  I am only documenting the current medications patients is taking and may change doses, add or discontinue medications based on information provided by the patient.     The patient has been provided with their current level of kidney function including eGFR and creatinine.    We discussed the potential for common complications of CKD including anemia, electrolyte abnormalities, abnormal fluid balance, mineral bone disease and malnutrition.    We discussed strategies to slow the progression of their kidney disease including:  Avoid nephrotoxic agents. Avoid over-the-counter and prescription NSAIDs (Ibuprofren, Motrin, Naproxyn, Aleve, Mobic, Celebrex, Toradol, Advil). All of these can worsen kidney function, elevate BP, cause fluid retention/swelling and elevate potassium. Avoid iodine contrast agents and gadolinium, which can worsen kidney function and/or cause kidney failure. Avoid phosphosoda bowel preps which can worsen kidney function.  Work to improve modifiable risk factors. Aim for good control of blood glucose without episodes of hypoglycemia. Notify the provider managing your diabetes if your blood glucose < 60. Aim for good blood pressure control without episodes of hypotension. Call the office if your systolic blood pressure is consistently < 110. Aim for good control of your cholesterol.  AIC goal <7.0  BP goal <130/80        Keeping these in goal range will help prevent progression of cardiovascular disease            and chronic kidney disease.    We discussed dietary modifications:  Low sodium diet: 2 gm/d or less  Limit/avoid high potassium foods  Avoid potassium containing salt substitutes  Limit/avoid high phosphorus foods  Limit daily protein intake to 0.8-1 gm/kg of your ideal body weight.    We discussed lifestyle modifications:  Make sure you are drinking plenty of fluids--64 ounces (1/2 gallon) daily  Exercise at least 30 minutes 5 x per week (total 150 minutes per week), example  brisk walking  Achieve and maintain a healthy weight (BMI 20-25)  Limit alcohol consumption to <2 drinks per day  Stop smoking  Make sure you stay current on important vaccines-- pneumonia vaccines (Pneumovax and Prevnar), flu vaccine, Hepatitis B (especially patients nearing renal replacement therapy and planning hemodialysis) and Covid-19 vaccine.     Recommendations:  Monitor your BP at home daily and record.  Bring readings to your next appt.  Call the office if your BP is persistently >130/80.  Seek urgent medical attention with signs and symptoms of uremia - extreme weakness, fatigue, confusion, anorexia, metallic taste in mouth, hiccoughs, cramping, itching, chest pain, swelling, or trouble sleeping.    Follow Up:   Follow up in about 4 months (around 1/27/2024).

## 2023-09-25 NOTE — ASSESSMENT & PLAN NOTE
C02 23 - Once again she stopped her NA HC03;    She wants to take Baking Soda - she will take 1/2 teaspoon po TID.

## 2023-09-25 NOTE — ASSESSMENT & PLAN NOTE
Serum Creatinine 1.61 / eGFR 33; (1.61 / eGFR 33 on 6/5/23 and 1.61 / 33 on 1/30/23 and 1.75 / eGFR 32 on 9/23/22) - Avoid NSAIDS, Assure 64 oz of water daily, Meds per med list above

## 2023-09-27 ENCOUNTER — OFFICE VISIT (OUTPATIENT)
Dept: NEPHROLOGY | Facility: CLINIC | Age: 78
End: 2023-09-27
Payer: COMMERCIAL

## 2023-09-27 VITALS
DIASTOLIC BLOOD PRESSURE: 60 MMHG | HEART RATE: 67 BPM | BODY MASS INDEX: 35.16 KG/M2 | HEIGHT: 67 IN | SYSTOLIC BLOOD PRESSURE: 132 MMHG | OXYGEN SATURATION: 99 % | WEIGHT: 224 LBS

## 2023-09-27 DIAGNOSIS — R80.9 NON-NEPHROTIC RANGE PROTEINURIA: ICD-10-CM

## 2023-09-27 DIAGNOSIS — N18.32 TYPE 2 DIABETES MELLITUS WITH STAGE 3B CHRONIC KIDNEY DISEASE, WITH LONG-TERM CURRENT USE OF INSULIN: ICD-10-CM

## 2023-09-27 DIAGNOSIS — Z91.148 NON COMPLIANCE W MEDICATION REGIMEN: ICD-10-CM

## 2023-09-27 DIAGNOSIS — E78.2 HYPERLIPIDEMIA, MIXED: ICD-10-CM

## 2023-09-27 DIAGNOSIS — E55.9 VITAMIN D DEFICIENCY, UNSPECIFIED: ICD-10-CM

## 2023-09-27 DIAGNOSIS — N18.32 ANEMIA IN STAGE 3B CHRONIC KIDNEY DISEASE: ICD-10-CM

## 2023-09-27 DIAGNOSIS — I12.9 HYPERTENSION WITH IMPAIRED RENAL FUNCTION: ICD-10-CM

## 2023-09-27 DIAGNOSIS — E11.22 TYPE 2 DIABETES MELLITUS WITH STAGE 3B CHRONIC KIDNEY DISEASE, WITH LONG-TERM CURRENT USE OF INSULIN: ICD-10-CM

## 2023-09-27 DIAGNOSIS — E87.20 METABOLIC ACIDOSIS: ICD-10-CM

## 2023-09-27 DIAGNOSIS — E66.01 MORBID OBESITY: ICD-10-CM

## 2023-09-27 DIAGNOSIS — D63.1 ANEMIA IN STAGE 3B CHRONIC KIDNEY DISEASE: ICD-10-CM

## 2023-09-27 DIAGNOSIS — N18.32 STAGE 3B CHRONIC KIDNEY DISEASE: ICD-10-CM

## 2023-09-27 DIAGNOSIS — Z79.4 TYPE 2 DIABETES MELLITUS WITH STAGE 3B CHRONIC KIDNEY DISEASE, WITH LONG-TERM CURRENT USE OF INSULIN: ICD-10-CM

## 2023-09-27 DIAGNOSIS — N25.81 SECONDARY HYPERPARATHYROIDISM OF RENAL ORIGIN: ICD-10-CM

## 2023-09-27 PROBLEM — S81.802A WOUND OF LEFT LEG: Status: RESOLVED | Noted: 2023-02-03 | Resolved: 2023-09-27

## 2023-09-27 PROCEDURE — 99214 OFFICE O/P EST MOD 30 MIN: CPT | Mod: S$GLB,,, | Performed by: NURSE PRACTITIONER

## 2023-09-27 PROCEDURE — 99214 PR OFFICE/OUTPT VISIT, EST, LEVL IV, 30-39 MIN: ICD-10-PCS | Mod: S$GLB,,, | Performed by: NURSE PRACTITIONER

## 2023-09-27 RX ORDER — EZETIMIBE 10 MG/1
1 TABLET ORAL DAILY
COMMUNITY

## 2023-09-27 RX ORDER — VIT C/E/ZN/COPPR/LUTEIN/ZEAXAN 250MG-90MG
10000 CAPSULE ORAL DAILY
Qty: 30 CAPSULE | Refills: 11 | Status: SHIPPED | OUTPATIENT
Start: 2023-09-27

## 2023-09-27 RX ORDER — SODIUM BICARBONATE 650 MG/1
650 TABLET ORAL 3 TIMES DAILY
Qty: 90 TABLET | Refills: 11 | Status: SHIPPED | OUTPATIENT
Start: 2023-09-27 | End: 2024-09-26

## 2023-09-27 RX ORDER — AMLODIPINE BESYLATE 5 MG/1
1 TABLET ORAL DAILY
COMMUNITY

## 2023-09-27 RX ORDER — CALCITRIOL 0.25 UG/1
0.25 CAPSULE ORAL DAILY
Qty: 30 CAPSULE | Refills: 11 | Status: SHIPPED | OUTPATIENT
Start: 2023-09-27

## 2023-09-27 NOTE — ASSESSMENT & PLAN NOTE
She stopped her Rocaltrol, Vitamin D3, NA HC03, and Iron on her own.  She has been counseled on the need to adhere to medication regimen.     I am restarting the Vitamin D3, Rocaltrol, and NA HC03 (Baking Soda).

## 2023-09-27 NOTE — ASSESSMENT & PLAN NOTE
She did not provide a specimen for Urine testing -  And she did not provide a urine specimen for her last OV -
